# Patient Record
(demographics unavailable — no encounter records)

---

## 2024-11-22 NOTE — CONSULT LETTER
[Dear  ___] : Dear  [unfilled], [Courtesy Letter:] : I had the pleasure of seeing your patient, [unfilled], in my office today. [Please see my note below.] : Please see my note below. [Sincerely,] : Sincerely, [FreeTextEntry2] : Peace Berrios MD [FreeTextEntry3] : Anthony Rios M.D., FACP\par  Professor of Medicine\par  [facility] [employer] at [facility]\par  Associate Chief, Division of Hematology\par  [facility]\par  [facility]\par  [address] [place] [address]\[place] [address]\par  [phone]\par  \par  \par  \par

## 2024-11-22 NOTE — HISTORY OF PRESENT ILLNESS
[Disease:__________________________] : Disease: [unfilled] [de-identified] : UPEP + IgGk, no BJP [de-identified] : She feels well. No major complaints. She notes no headaches, visual problems, chest pain, shortness of breath, abdominal pain, fever, night sweats, swollen glands, skeletal pain, bleeding, bruising. Weight is stable. She had the flu shot. Declines COVID booster.

## 2024-11-22 NOTE — RESULTS/DATA
[FreeTextEntry1] : 11/16/24 WBC 5450 Hgb 13.5 Hct 42.9 MCV 85.3 Platelets 236,000 Diff normal CMP normal   SPEP M-spike 0.5, gamma-migrating paraprotein identified.  IgG 1123 IgA 324 IgM 59 SFLC kappa 7.02 lambda 1.06 ratio 6.62  5/18/24 CMP normal   SPEP M-spike 0.6, gamma-migrating paraprotein identified.  SPIEP IgG Kappa Band Identified IgG 1139 IgA 317 IgM 57 SFLC kappa 6.02 lambda 1.06 ratio 6.62

## 2024-11-22 NOTE — ASSESSMENT
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 The amount present is very small. She has no lymphadenopathy nor hepatosplenomegaly. There are no cytopenias. 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 This is part of the normal aging process. By the age of 60, 5% of individuals are affected and by 90 approximately 25%. A small percentage of individuals with this can evolve to non-Hodgkin lymphoma or multiple myeloma. In view of this, observation with periodic monitoring is recommended.  Plan: Observe COVID vaccine as recommended.  RTC 6 months.

## 2024-11-22 NOTE — ADDENDUM
[FreeTextEntry1] :  I, Quinngagedivine Jules, acted solely as a scribe for Dr. Anthony Rios on 11/22/2024 .  All medical entries made by the Scribe were at my, Dr. Anthony Rios's, direction and personally dictated by me on 11/22/2024 . I have reviewed the chart and agree that the record accurately reflects my personal performance of the history, physical exam, assessment and plan. I have also personally directed, reviewed, and agreed with the chart.

## 2025-05-30 NOTE — ADDENDUM
[FreeTextEntry1] : Documented by Antonio Gaspar acting as a scribe for Dr. Anthony Rios on 05/30/2025. All medical record entries made by the Scribe were at my, Dr. Anthony Rios, direction and personally dictated by me on 05/30/2025. I have reviewed the chart and agree that the record accurately reflects my personal performance of the history, physical exam, assessment and plan. I have also personally directed, reviewed, and agree with the discharge instructions.

## 2025-05-30 NOTE — RESULTS/DATA
[FreeTextEntry1] : 5/24/25 WBC 5,350 Hgb 12.8 Hct 41 MCV 85.8 Platelets 239,000 Diff normal ANC 2,980  CMP normal   SPEP M-Robin 0.7 IgG 1138 IgA 303 IgM 61 SFLC kappa 6.29 lambda 1.28 ratio 4.91

## 2025-05-30 NOTE — HISTORY OF PRESENT ILLNESS
[Disease:__________________________] : Disease: [unfilled] [de-identified] : UPEP + IgGk, no BJP [de-identified] : She feels well. No major complaints. She notes no headaches, visual problems, chest pain, SOB, abdominal pain, fever, night sweats, swollen glands, skeletal pain, bleeding, bruising. Weight is stable.

## 2025-05-30 NOTE — ASSESSMENT
[Palliative Care Plan] : not applicable at this time [FreeTextEntry1] : 60-year-old female with IgG kappa monoclonal gammopathy. The amount present is very small. She has no lymphadenopathy nor hepatosplenomegaly. There are no cytopenias. 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 This is part of the normal aging process. By the age of 60, 5% of individuals are affected and by 90 approximately 25%. A small percentage of individuals with this can evolve to non-Hodgkin lymphoma or multiple myeloma. In view of this, observation with periodic monitoring is recommended.  Plan: Observe  RTC 6 months.